# Patient Record
Sex: FEMALE | Race: WHITE | Employment: FULL TIME
[De-identification: names, ages, dates, MRNs, and addresses within clinical notes are randomized per-mention and may not be internally consistent; named-entity substitution may affect disease eponyms.]

---

## 2017-04-07 PROBLEM — Z13.29 SCREENING FOR ENDOCRINE DISORDER: Status: ACTIVE | Noted: 2017-04-07

## 2017-04-07 PROBLEM — R93.5 ABNORMAL CT OF THE ABDOMEN: Status: ACTIVE | Noted: 2017-04-07

## 2017-07-07 PROBLEM — R94.31 ECG ABNORMAL: Status: ACTIVE | Noted: 2017-07-07

## 2018-01-31 PROBLEM — J30.9 ALLERGIC RHINITIS, UNSPECIFIED SEASONALITY, UNSPECIFIED TRIGGER: Status: ACTIVE | Noted: 2018-01-31

## 2018-06-14 PROBLEM — K64.9 HEMORRHOIDS, UNSPECIFIED HEMORRHOID TYPE: Status: ACTIVE | Noted: 2018-06-14

## 2018-06-14 PROBLEM — Z00.00 PREVENTATIVE HEALTH CARE: Status: ACTIVE | Noted: 2018-06-14

## 2018-06-14 PROBLEM — J45.909 REACTIVE AIRWAY DISEASE WITHOUT COMPLICATION, UNSPECIFIED ASTHMA SEVERITY, UNSPECIFIED WHETHER PERSISTENT: Status: ACTIVE | Noted: 2018-06-14

## 2018-08-06 PROBLEM — M54.9 BACK PAIN, UNSPECIFIED BACK LOCATION, UNSPECIFIED BACK PAIN LATERALITY, UNSPECIFIED CHRONICITY: Status: ACTIVE | Noted: 2018-08-06

## 2018-08-06 PROCEDURE — 81025 URINE PREGNANCY TEST: CPT | Performed by: INTERNAL MEDICINE

## 2018-12-07 PROBLEM — Z13.29 SCREENING FOR ENDOCRINE DISORDER: Status: RESOLVED | Noted: 2017-04-07 | Resolved: 2018-12-07

## 2018-12-07 PROBLEM — J30.9 ALLERGIC RHINITIS, UNSPECIFIED SEASONALITY, UNSPECIFIED TRIGGER: Status: RESOLVED | Noted: 2018-01-31 | Resolved: 2018-12-07

## 2018-12-07 PROBLEM — F32.A DEPRESSION, UNSPECIFIED DEPRESSION TYPE: Status: ACTIVE | Noted: 2018-12-07

## 2018-12-07 PROBLEM — F41.9 ANXIETY: Status: ACTIVE | Noted: 2018-12-07

## 2018-12-27 PROCEDURE — 87086 URINE CULTURE/COLONY COUNT: CPT | Performed by: OBSTETRICS & GYNECOLOGY

## 2019-01-17 PROCEDURE — 87086 URINE CULTURE/COLONY COUNT: CPT | Performed by: INTERNAL MEDICINE

## 2019-01-17 PROCEDURE — 81001 URINALYSIS AUTO W/SCOPE: CPT | Performed by: INTERNAL MEDICINE

## 2019-01-17 PROCEDURE — 87147 CULTURE TYPE IMMUNOLOGIC: CPT | Performed by: INTERNAL MEDICINE

## 2019-01-17 PROCEDURE — 87040 BLOOD CULTURE FOR BACTERIA: CPT | Performed by: INTERNAL MEDICINE

## 2019-01-19 PROBLEM — R94.31 ECG ABNORMAL: Status: RESOLVED | Noted: 2017-07-07 | Resolved: 2019-01-19

## 2019-01-19 PROBLEM — R50.9 FEVER, UNSPECIFIED FEVER CAUSE: Status: ACTIVE | Noted: 2019-01-19

## 2019-01-19 PROBLEM — R73.9 HYPERGLYCEMIA: Status: ACTIVE | Noted: 2019-01-19

## 2019-01-19 PROBLEM — Z90.710 STATUS POST VAGINAL HYSTERECTOMY: Status: ACTIVE | Noted: 2019-01-19

## 2019-02-04 PROBLEM — J30.9 ALLERGIC RHINITIS, UNSPECIFIED SEASONALITY, UNSPECIFIED TRIGGER: Status: RESOLVED | Noted: 2018-01-31 | Resolved: 2019-02-04

## 2019-02-04 PROBLEM — R73.9 HYPERGLYCEMIA: Status: RESOLVED | Noted: 2019-01-19 | Resolved: 2019-02-04

## 2019-02-04 PROBLEM — R50.9 FEVER, UNKNOWN ORIGIN: Status: ACTIVE | Noted: 2019-01-19

## 2019-02-04 PROBLEM — R93.5 ABNORMAL CT OF THE ABDOMEN: Status: RESOLVED | Noted: 2017-04-07 | Resolved: 2019-02-04

## 2019-02-04 PROCEDURE — 87086 URINE CULTURE/COLONY COUNT: CPT | Performed by: OBSTETRICS & GYNECOLOGY

## 2019-02-04 PROCEDURE — 81001 URINALYSIS AUTO W/SCOPE: CPT | Performed by: OBSTETRICS & GYNECOLOGY

## 2020-03-09 PROBLEM — R06.02 SOB (SHORTNESS OF BREATH): Status: ACTIVE | Noted: 2020-03-09

## 2020-03-16 PROBLEM — J30.2 PERENNIAL ALLERGIC RHINITIS WITH SEASONAL VARIATION: Status: ACTIVE | Noted: 2020-03-16

## 2020-03-16 PROBLEM — B34.9 VIRAL INFECTION: Status: ACTIVE | Noted: 2020-03-16

## 2020-03-16 PROBLEM — J30.89 PERENNIAL ALLERGIC RHINITIS WITH SEASONAL VARIATION: Status: ACTIVE | Noted: 2020-03-16

## 2020-07-29 PROBLEM — J45.909 REACTIVE AIRWAY DISEASE WITHOUT COMPLICATION, UNSPECIFIED ASTHMA SEVERITY, UNSPECIFIED WHETHER PERSISTENT: Status: RESOLVED | Noted: 2018-06-14 | Resolved: 2020-07-29

## 2020-07-29 PROBLEM — J30.9 ALLERGIC RHINITIS, UNSPECIFIED SEASONALITY, UNSPECIFIED TRIGGER: Status: RESOLVED | Noted: 2018-01-31 | Resolved: 2020-07-29

## 2020-07-29 PROBLEM — R06.02 SOB (SHORTNESS OF BREATH): Status: RESOLVED | Noted: 2020-03-09 | Resolved: 2020-07-29

## 2020-07-29 PROBLEM — R50.9 FEVER, UNKNOWN ORIGIN: Status: RESOLVED | Noted: 2019-01-19 | Resolved: 2020-07-29

## 2020-07-29 PROBLEM — B34.9 VIRAL INFECTION: Status: RESOLVED | Noted: 2020-03-16 | Resolved: 2020-07-29
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
## 2020-08-19 PROBLEM — S06.0X0D CONCUSSION WITHOUT LOSS OF CONSCIOUSNESS, SUBSEQUENT ENCOUNTER: Status: ACTIVE | Noted: 2020-08-19

## 2020-10-08 ENCOUNTER — NURSE TRIAGE (OUTPATIENT)
Dept: OTHER | Facility: CLINIC | Age: 40
End: 2020-10-08

## 2020-10-08 NOTE — TELEPHONE ENCOUNTER
Patient called in via the Employee Initial Symptoms Review Line to report symptoms of abdominal pain, diarrhea, vomiting and fatigue. Pt states symptoms started today. Reports possible exposure to positive patients at work. Hx of IBS. Denies fever or breathing difficulty at this time. Informed of disposition. Provided with occupational health number to follow up with as well. Care advice as documented. Instructed to call back with worsening symptoms. Verbalized understanding and denies any further questions/concerns. Please do not respond to the triage nurse through this encounter. Any subsequent communication should be directly with the patient. Reason for Disposition   HIGH RISK patient (e.g., age > 59 years, diabetes, heart or lung disease, weak immune system)    Answer Assessment - Initial Assessment Questions  1. COVID-19 DIAGNOSIS: \"Who made your Coronavirus (COVID-19) diagnosis? \" \"Was it confirmed by a positive lab test?\" If not diagnosed by a HCP, ask \"Are there lots of cases (community spread) where you live? \" (See public health department website, if unsure)      Possible exposure at work  2. ONSET: \"When did the COVID-19 symptoms start? \"       Today 10/8/20  3. WORST SYMPTOM: \"What is your worst symptom? \" (e.g., cough, fever, shortness of breath, muscle aches)      Abdominal pain  4. COUGH: \"Do you have a cough? \" If so, ask: \"How bad is the cough? \"        no  5. FEVER: \"Do you have a fever? \" If so, ask: \"What is your temperature, how was it measured, and when did it start? \"      No  6. RESPIRATORY STATUS: \"Describe your breathing? \" (e.g., shortness of breath, wheezing, unable to speak)       \"normal\"  7. BETTER-SAME-WORSE: Merla Soho you getting better, staying the same or getting worse compared to yesterday? \"  If getting worse, ask, \"In what way? \"      [place] started today  8. HIGH RISK DISEASE: \"Do you have any chronic medical problems? \" (e.g., asthma, heart or lung disease, weak immune system, etc.)      IBS  9. PREGNANCY: \"Is there any chance you are pregnant? \" \"When was your last menstrual period? \"      No  10. OTHER SYMPTOMS: \"Do you have any other symptoms? \"  (e.g., chills, fatigue, headache, loss of smell or taste, muscle pain, sore throat)        Abdominal pain, diarrhea, fatigue, vomiting.     Protocols used: CORONAVIRUS (COVID-19) DIAGNOSED OR SUSPECTED-ADULT-

## 2020-11-18 PROBLEM — J45.901 MODERATE ASTHMA WITH ACUTE EXACERBATION, UNSPECIFIED WHETHER PERSISTENT: Status: ACTIVE | Noted: 2020-11-18

## 2020-11-18 PROBLEM — J06.9 UPPER RESPIRATORY TRACT INFECTION, UNSPECIFIED TYPE: Status: ACTIVE | Noted: 2020-11-18

## 2020-12-01 ENCOUNTER — NURSE TRIAGE (OUTPATIENT)
Dept: OTHER | Facility: CLINIC | Age: 40
End: 2020-12-01

## 2020-12-01 NOTE — TELEPHONE ENCOUNTER
period? \" \"Did you deliver in the last 2 weeks? \"      Denies    10. HIGH RISK: \"Do you have any heart or lung problems? Do you have a weak immune system? \" (e.g., CHF, COPD, asthma, HIV positive, chemotherapy, renal failure, diabetes mellitus, sickle cell anemia)        Denies    Protocols used: CORONAVIRUS (COVID-19) EXPOSURE-ADULT-[place], CORONAVIRUS (COVID-19) DIAGNOSED OR SUSPECTED-ADULT-[place]    Patient called [facility]    See above questions and answers. Caller talking full sentences without any distress on phone. Discussed disposition and patient agreeable. Aware to call back with any concerns or persistent, worsening, or new symptoms develop. Attention provider: Your patient utilized nurse triage services offered by employer, payer or community. This encounter includes an overview of the reason for call, assessment and recommended disposition. Please do not respond through this encounter as the response is not directed to a shared pool. Sent pt to [id number] Nurse Symptom Review.

## 2020-12-30 ENCOUNTER — OFFICE VISIT (OUTPATIENT)
Dept: PRIMARY CARE CLINIC | Age: 40
End: 2020-12-30

## 2020-12-30 ENCOUNTER — HOSPITAL ENCOUNTER (OUTPATIENT)
Age: 40
Setting detail: SPECIMEN
Discharge: HOME | End: 2020-12-30
Payer: COMMERCIAL

## 2020-12-30 VITALS — TEMPERATURE: 97 F

## 2020-12-30 PROCEDURE — 99213 OFFICE O/P EST LOW 20 MIN: CPT | Performed by: NURSE PRACTITIONER

## 2020-12-30 PROCEDURE — U0002 COVID-19 LAB TEST NON-CDC: HCPCS

## 2020-12-30 NOTE — PROGRESS NOTES
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, Employee RN access line    [] Fevers  [] Chills  [x] Cough  [] Coughing up blood  [] Chest Congestion  [x] Nasal Congestion  [] Feeling short of breath  [] Sometimes  [] Frequently  [] All the time  [] Chest pain  [x] Headaches  [x]Tolerable  [] Severe  [x] Sore throat  [] Muscle aches  [] Nausea  [] Vomiting  []Unable to keep fluids down  [] Diarrhea  []Severe    [x] OTHER SYMPTOMS: DIZZINESS/ LIGHTHEADED       Symptom Duration:   [] 1  [] 2   [] 3   [] 4    [] 5   [] 6   [] 7   [x] 8   [] 9   [] 10   [] 11   [] 12   [] 13   [] 14   [] Longer than 14 days    Symptom course:   [x] Worsening     [] Stable     [] Improving    RISK FACTORS:    [] Pregnant or possibly pregnant  [] Age over 61  [] Diabetes  [] Heart disease  [] Asthma  [] COPD/Other chronic lung diseases  [] Active Cancer  [] On Chemotherapy  [] Taking oral steroids  [] History Lymphoma/Leukemia  [x] Close contact with a lab confirmed COVID-19 patient within 14 days of symptom onset  [] History of travel from affected geographical areas within 14 days of symptom onset       VITALS:  There were no vitals filed for this visit. TESTS:    POCT FLU:  [] Positive     []Negative    ASSESSMENT:    [] Flu  [] Possible COVID-19  [] Strep    PLAN:    [] Discharge home with written instructions for:  [] Flu management  [] Possible COVID-19 infection with self-quarantine and management of symptoms  [] Follow-up with primary care physician or emergency department if worsens  [] Evaluation per physician/NP/PA in clinic  [] Sent to ER       An  electronic signature was used to authenticate this note.      --Jorge Garnett MA on 12/30/2020 at 1:32 PM

## 2020-12-30 NOTE — PATIENT INSTRUCTIONS
Your COVID 19 test can take 3-5 days for the results come back. We ask that you make a Mychart page and view your test results this way. You will need to Self quarantine until you know your results. Increase fluids rest  Saline nasal spray as directed  Warm salt gargles for throat discomfort  Monitor temperature twice a day  Tylenol for fevers and/or discomfort. If symptoms are worse -Go to the ER. Follow up with your primary doctor    To Whom it May Concern:    Meli Cm has been tested for COVID on 12/30/20. They may NOT return to work until their lab test results back and they been fever free for 3 days. If test is positive they must stay home for 2 weeks or until they test negative or as directed by the [facility] Department.

## 2020-12-30 NOTE — PROGRESS NOTES
12/30/2020    HPI:  Chief complaint and history of present illness as per medical assistant/nurse documented today in the Flu/COVID-19 clinic. MEDICATIONS:  Prior to Visit Medications    Not on File       Not on File, No past medical history on file., No past surgical history on file. PHYSICAL EXAM:  Physical Exam  Temp:  97.0  Heart Rate:  78    Pulse Ox:  99    Constitutional:  Well developed, well nourished  HENT:  Normocephalic, atraumatic, bilateral external ears normal, bilateral TMs normal, oropharynx moist, nose normal  Eyes:  conjunctiva normal, no discharge, no scleral icterus  Cardiovascular:  Normal heart rate, normal rhythm, no murmurs, gallops or rubs  Thorax & Lungs:  Normal breath sounds, no respiratory distress, no wheezing  Skin:  Warm, dry, no erythema, no rash  Neurologic:  Alert & oriented   Psychiatric:  Affect normal, mood normal    ASSESSMENT/PLAN:  1. Close exposure to COVID-19 virus    - COVID-19 Ambulatory    2. Cough    - COVID-19 Ambulatory    3. Dizziness    - COVID-19 Ambulatory  Pt here for second test in in last 10 days, went to work today and was told to leave to get covid test due to being lightheaded and dizzy. Advised pt to increase fluids and activity at home. FOLLOW-UP:  No follow-ups on file.     In addition to other information, the printed after visit summary provided to the patient includes:  [x] COVID-19 Self care instructions  [x] [id number] General patient information    April T Hunter

## 2021-01-01 LAB
SARS-COV-2: NOT DETECTED
SOURCE: NORMAL

## 2021-01-06 ENCOUNTER — NURSE TRIAGE (OUTPATIENT)
Dept: OTHER | Facility: CLINIC | Age: 41
End: 2021-01-06

## 2021-01-06 NOTE — TELEPHONE ENCOUNTER
Reason for Disposition   SEVERE chest pain    Answer Assessment - Initial Assessment Questions  1. LOCATION: \"Where does it hurt? \"      riight had my sternum and deep inside. Feels like someone squeezing my lungs. 2. RADIATION: \"Does the pain go anywhere else? \" (e.g., into neck, jaw, arms, back)      Back    3. ONSET: \"When did the chest pain begin? \" (Minutes, hours or days)       This morning around 0200    4. PATTERN \"Does the pain come and go, or has it been constant since it started? \"  \"Does it get worse with exertion? \"       Constant. Worse with exertion or with deep breath. 5. DURATION: \"How long does it last\" (e.g., seconds, minutes, hours)      It's constant    6. SEVERITY: \"How bad is the pain? \"  (e.g., Scale 1-10; mild, moderate, or severe)     - MILD (1-3): doesn't interfere with normal activities      - MODERATE (4-7): interferes with normal activities or awakens from sleep     - SEVERE (8-10): excruciating pain, unable to do any normal activities        8/10    7. CARDIAC RISK FACTORS: \"Do you have any history of heart problems or risk factors for heart disease? \" (e.g., angina, prior heart attack; diabetes, high blood pressure, high cholesterol, smoker, or strong family history of heart disease)      No    8. PULMONARY RISK FACTORS: \"Do you have any history of lung disease? \"  (e.g., blood clots in lung, asthma, emphysema, birth control pills)      No    9. CAUSE: \"What do you think is causing the chest pain? \"      I'm not sure    10. OTHER SYMPTOMS: \"Do you have any other symptoms? \" (e.g., dizziness, nausea, vomiting, sweating, fever, difficulty breathing, cough)        Dizziness, vertigo,cough, it hurts to take deep breaths    11. PREGNANCY: \"Is there any chance you are pregnant? \" \"When was your last menstrual period? \"        No- just had D & C 12-    Protocols used: CHEST PAIN-ADULT-[place]  Call became disconnected. Tried to call pt back and phone disconnected.   Caller had initially come through on EIS line and pt triaged due to symptoms of blood loss after D & C. Attempted to call NP for 2nd level triage, unable to get in contact with either one. . Left message with Frederic Gutierrez NP.     80- Was able to find a working number for patient. Pt has appt with OB at 1600 today.

## 2021-02-24 ENCOUNTER — PROCEDURE VISIT (OUTPATIENT)
Dept: CARDIOLOGY CLINIC | Age: 41
End: 2021-02-24
Payer: COMMERCIAL

## 2021-02-24 ENCOUNTER — NURSE TRIAGE (OUTPATIENT)
Dept: OTHER | Facility: CLINIC | Age: 41
End: 2021-02-24

## 2021-02-24 ENCOUNTER — OFFICE VISIT (OUTPATIENT)
Dept: CARDIOLOGY CLINIC | Age: 41
End: 2021-02-24
Payer: COMMERCIAL

## 2021-02-24 VITALS
SYSTOLIC BLOOD PRESSURE: 138 MMHG | BODY MASS INDEX: 24.92 KG/M2 | DIASTOLIC BLOOD PRESSURE: 88 MMHG | HEART RATE: 100 BPM | HEIGHT: 61 IN | WEIGHT: 132 LBS

## 2021-02-24 DIAGNOSIS — R07.9 CHEST PAIN, UNSPECIFIED TYPE: Primary | ICD-10-CM

## 2021-02-24 DIAGNOSIS — R06.02 SOB (SHORTNESS OF BREATH): ICD-10-CM

## 2021-02-24 DIAGNOSIS — R00.2 PALPITATIONS: ICD-10-CM

## 2021-02-24 DIAGNOSIS — R07.2 PRECORDIAL PAIN: ICD-10-CM

## 2021-02-24 DIAGNOSIS — R07.9 CHEST PAIN, UNSPECIFIED TYPE: ICD-10-CM

## 2021-02-24 LAB
LV EF: 58 %
LVEF MODALITY: NORMAL

## 2021-02-24 PROCEDURE — G8427 DOCREV CUR MEDS BY ELIG CLIN: HCPCS | Performed by: INTERNAL MEDICINE

## 2021-02-24 PROCEDURE — 93306 TTE W/DOPPLER COMPLETE: CPT | Performed by: INTERNAL MEDICINE

## 2021-02-24 PROCEDURE — 99203 OFFICE O/P NEW LOW 30 MIN: CPT | Performed by: INTERNAL MEDICINE

## 2021-02-24 PROCEDURE — 1036F TOBACCO NON-USER: CPT | Performed by: INTERNAL MEDICINE

## 2021-02-24 PROCEDURE — G8484 FLU IMMUNIZE NO ADMIN: HCPCS | Performed by: INTERNAL MEDICINE

## 2021-02-24 PROCEDURE — G8420 CALC BMI NORM PARAMETERS: HCPCS | Performed by: INTERNAL MEDICINE

## 2021-02-24 PROCEDURE — 93000 ELECTROCARDIOGRAM COMPLETE: CPT | Performed by: INTERNAL MEDICINE

## 2021-02-24 NOTE — TELEPHONE ENCOUNTER
Employee calling in because she is having chest pain and increased heart rate. 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 They did a EKG, and ECHO and the doctor looked at them. Employee states that the cardiologist is going to put her on medication and is going home for the day. Just wanted to call in and advise us of what happened and the care that she has received already. Reason for Disposition   Information only question and nurse able to answer    Answer Assessment - Initial Assessment Questions  1. REASON FOR CALL or QUESTION: \"What is your reason for calling today? \" or \"How can I best help you? \" or \"What question do you have that I can help answer? \"      See above. Protocols used: INFORMATION ONLY CALL - NO TRIAGE-ADULT-[place]    B caller    Brief description of triage: No triage just info only as to employee has already gotten care. Care advice provided, patient verbalizes understanding; denies any other questions or concerns; instructed to call back for any new or worsening symptoms. Attention provider: Your patient utilized nurse triage services offered by employer, payer or community. This encounter includes an overview of the reason for call, assessment and recommended disposition. Please do not respond through this encounter as the response is not directed to a shared pool.

## 2021-02-24 NOTE — PROGRESS NOTES
CARDIAC CONSULT NOTE       Yancy Galvan  42 y.o.  female    Chief Complaint   Patient presents with    Chest Pain     Patient here today complaining of left side chest pain that started earlier this morning, lasting 5 to 6 seconds that radiates to left shoulder area; tender to the touch and deep breathing makes it worse. Patient denies edema.  Palpitations     Patient complains of  constant palpitations that started this morning with the chest pain    Shortness of Breath     Patient complains of shortness of breath that started this morning with the chest pain and palpitations.  Dizziness     Patient complains of dizziness that is constant since chest pain, palpitations, and shortness of breath that all started this morning. Referring physician:  No primary care provider on file. Primary care physician:  No primary care provider on file. History of Present Illness:     Yancy Galvan is a 42 y.o. female referred for evaluation and management of chest pain. CHEST PAIN:  1. When did it began: Earlier this am. 5-6 second episodes  2. How long does it last:as above  3. How severe:8.5/10  4. Radiation:Left shoulder  5. Aggravating factors:deep breaths  6. Relieving factors:no  7. Associated features:Palpitations & SOB  8. Tenderness to palpation: Yes     has a past medical history of PTSD (post-traumatic stress disorder) and Seizures ([address] has no past surgical history on file. reports that she has never smoked. She has never used smokeless tobacco.    Family history is unknown by patient. Review of Systems:   1. Cardiovascular: No chest pain, dyspnea on exertion, palpitations or loss of consciousness  2. Respiratory: No cough or wheezing    3. Musculoskeletal:  No gait disturbance, weakness, muscle cramps, aches & pains or joint complaints  4. Neurological: No TIA or stroke symptoms  5. Psychiatric: No anxiety or depression  6.  Hematologic/Lymphatic: No medication:No .  3.Beta-Blocker therapy for CAD, if prior Myocardial Infarction:No   4. Counselled regarding smoking cessation. No   Patient does not Smoke. 5.Anticoagulation therapy (for A.Fib) No   Does Not have A.Fib.  6.Discussed weight management strategies. Assessment, Recommendations & Tests:     CHEST PAIN: Most likely Pleuro-Pericarditic. Echo is normal no effusion. Suggest Motrin 400 mg TID with food X 5 days. If no improvement will add Colchicine. Office Visit in 3 months.      Eb Matthews MD, 2/24/2021 10:35 AM

## 2021-02-24 NOTE — PATIENT INSTRUCTIONS
CHEST PAIN: Most likely Pleuro-Pericarditic. Echo is normal no effusion. Suggest Motrin 400 mg TID with food X 5 days. If no improvement will add Colchicine. Office Visit in 3 months.

## 2021-03-03 ENCOUNTER — TELEPHONE (OUTPATIENT)
Dept: CARDIOLOGY CLINIC | Age: 41
End: 2021-03-03

## 2021-03-03 NOTE — TELEPHONE ENCOUNTER
Results of Echo given to patient. Summary   Left ventricular function is normal, EF is estimated at 55-60%. No evidence of diastolic dysfunction. No regional wall motion abnormalities were detected. No significant valvular disease noted. RVSP is 9 mmHg.    No evidence of pericardial effusion

## 2021-04-23 ENCOUNTER — HOSPITAL ENCOUNTER (EMERGENCY)
Age: 41
Discharge: HOME | End: 2021-04-23
Payer: COMMERCIAL

## 2021-04-23 VITALS
BODY MASS INDEX: 24.11 KG/M2 | TEMPERATURE: 98.8 F | WEIGHT: 131 LBS | HEART RATE: 82 BPM | RESPIRATION RATE: 17 BRPM | DIASTOLIC BLOOD PRESSURE: 75 MMHG | OXYGEN SATURATION: 100 % | SYSTOLIC BLOOD PRESSURE: 118 MMHG | HEIGHT: 62 IN

## 2021-04-23 DIAGNOSIS — R21 RASH: ICD-10-CM

## 2021-04-23 DIAGNOSIS — T78.40XA ALLERGIC REACTION, INITIAL ENCOUNTER: Primary | ICD-10-CM

## 2021-04-23 PROCEDURE — 99284 EMERGENCY DEPT VISIT MOD MDM: CPT

## 2021-04-23 PROCEDURE — 6370000000 HC RX 637 (ALT 250 FOR IP): Performed by: PHYSICIAN ASSISTANT

## 2021-04-23 RX ORDER — DIPHENHYDRAMINE HCL 25 MG
25 TABLET ORAL EVERY 6 HOURS PRN
Status: DISCONTINUED | OUTPATIENT
Start: 2021-04-23 | End: 2021-04-23 | Stop reason: HOSPADM

## 2021-04-23 RX ORDER — DIPHENHYDRAMINE HCL 25 MG
25 CAPSULE ORAL EVERY 6 HOURS PRN
Qty: 20 CAPSULE | Refills: 0 | Status: SHIPPED | OUTPATIENT
Start: 2021-04-23 | End: 2021-05-03

## 2021-04-23 RX ADMIN — DIPHENHYDRAMINE HCL 25 MG: 25 TABLET ORAL at 11:22

## 2021-04-23 NOTE — ED NOTES
Discharge instructions reviewed with patient, verbalized understanding and agreeable to discharge.      Cade Holder RN  04/23/21 [id number]

## 2021-04-23 NOTE — ED PROVIDER NOTES
EMERGENCY DEPARTMENT ENCOUNTER      PCP: No primary care provider on file. CHIEF COMPLAINT    Chief Complaint   Patient presents with    Allergic Reaction     itching to forehead from an oil a coworker used to pray for patient         This patient was not evaluated by the attending physician. I have independently evaluated this patient . HPI    Spencer Andres is a 42 y.o. female who presents with a rash since the onset only 2 hours. Context is patient is allergic to olive oil and coworker touched her forehead after handling all of oil. She notes development of red itchy rash on forehead only since this time. Denies any constitutional symptoms, airway symptoms. She has not tried any medications for relief. REVIEW OF SYSTEMS    General: Denies fevers. ENT: Denies throat swelling or tongue swelling  Pulmonary: Denies wheezes, difficulty breathing,  or chest tightness  Skin: See HPI    All other review of systems are negative  See HPI and nursing notes for additional information     PAST MEDICAL & SURGICAL HISTORY    Past Medical History:   Diagnosis Date    PTSD (post-traumatic stress disorder)     Life long    Seizures ([facility] [address]     Since age 1     No past surgical history on file.     CURRENT MEDICATIONS        ALLERGIES    Allergies   Allergen Reactions    Phenobarbital Shortness Of Breath and Swelling       SOCIAL & FAMILY HISTORY    Social History     Socioeconomic History    Marital status:      Spouse name: Not on file    Number of children: Not on file    Years of education: Not on file    Highest education level: Not on file   Occupational History    Not on file   Social Needs    Financial resource strain: Not on file    Food insecurity     Worry: Not on file     Inability: Not on file    Transportation needs     Medical: Not on file     Non-medical: Not on file   Tobacco Use    Smoking status: Never Smoker    Smokeless tobacco: Never Used   Substance and Sexual Activity    distress. Rash is limited to forehead only. Serial rechecks reveal patient's rash improved and subjective itching improvement. Rash barely noticeable. Patient states she would like to go home at this time as rash is improving. Patient observed for approximately 3 hours post exposure to known allergen and I feel she is safe for discharge home at this time. Plan for discharge with Benadryl Rx. Patient has H2 blocker medication she takes daily-I recommend she continue to do so and avoid any known environmental allergens. Return to emergency Department warning signs discussed in detail with patient today who understands and agrees including but not limited to worsening rash,fever, mouth/tongue/lip swelling, trouble breathing or swallowing, or any new symptoms not present today. Clinical  IMPRESSION    1. Allergic reaction, initial encounter    2. Rash                Comment: Please note this report has been produced using speech recognition software and may contain errors related to that system including errors in grammar, punctuation, and spelling, as well as words and phrases that may be inappropriate. If there are any questions or concerns please feel free to contact the dictating provider for clarification.        Josie Kennedyma  04/23/21 0702

## 2021-05-19 PROBLEM — R05.9 COUGH: Status: ACTIVE | Noted: 2021-05-19

## 2021-08-31 PROBLEM — R05.9 COUGH: Status: RESOLVED | Noted: 2021-05-19 | Resolved: 2021-08-31

## 2021-08-31 PROBLEM — J06.9 UPPER RESPIRATORY TRACT INFECTION, UNSPECIFIED TYPE: Status: RESOLVED | Noted: 2020-11-18 | Resolved: 2021-08-31

## 2021-09-05 PROBLEM — Z01.818 PREOPERATIVE EXAMINATION: Status: ACTIVE | Noted: 2021-09-05

## 2021-10-10 ENCOUNTER — APPOINTMENT (OUTPATIENT)
Dept: GENERAL RADIOLOGY | Age: 41
End: 2021-10-10
Payer: COMMERCIAL

## 2021-10-10 ENCOUNTER — HOSPITAL ENCOUNTER (EMERGENCY)
Age: 41
Discharge: HOME | End: 2021-10-10
Attending: EMERGENCY MEDICINE
Payer: COMMERCIAL

## 2021-10-10 VITALS
DIASTOLIC BLOOD PRESSURE: 87 MMHG | HEART RATE: 91 BPM | TEMPERATURE: 97.3 F | WEIGHT: 134 LBS | OXYGEN SATURATION: 98 % | RESPIRATION RATE: 14 BRPM | HEIGHT: 61 IN | BODY MASS INDEX: 25.3 KG/M2 | SYSTOLIC BLOOD PRESSURE: 124 MMHG

## 2021-10-10 DIAGNOSIS — S93.402A SPRAIN OF LEFT ANKLE, UNSPECIFIED LIGAMENT, INITIAL ENCOUNTER: Primary | ICD-10-CM

## 2021-10-10 PROCEDURE — 73610 X-RAY EXAM OF ANKLE: CPT

## 2021-10-10 PROCEDURE — 99284 EMERGENCY DEPT VISIT MOD MDM: CPT

## 2021-10-10 ASSESSMENT — PAIN SCALES - GENERAL: PAINLEVEL_OUTOF10: 7

## 2021-10-10 ASSESSMENT — PAIN DESCRIPTION - ORIENTATION: ORIENTATION: LEFT

## 2021-10-10 ASSESSMENT — PAIN DESCRIPTION - DESCRIPTORS: DESCRIPTORS: ACHING

## 2021-10-10 NOTE — ED PROVIDER NOTES
Expenses:    Food Insecurity:     Worried About Running Out of Food in the Last Year:     [address] in the Last Year:    Transportation Needs:     Lack of Transportation (Medical):  Lack of Transportation (Non-Medical):    Physical Activity:     Days of Exercise per Week:     Minutes of Exercise per Session:    Stress:     Feeling of Stress :    Social Connections:     Frequency of Communication with Friends and Family:     Frequency of Social Gatherings with Friends and Family:     Attends Sikh Services:     Active Member of Clubs or Organizations:     Attends Club or Organization Meetings:     Marital Status:    Intimate Partner Violence:     Fear of Current or Ex-Partner:     Emotionally Abused:     Physically Abused:     Sexually Abused:      Family History   Family history unknown: Yes         PHYSICAL EXAM    VITAL SIGNS: /87   Pulse 91   Temp 97.3 °F (36.3 °C) (Skin)   Resp 14   Ht 5' 1\" (1.549 m)   Wt 134 lb (60.8 kg)   SpO2 98%   BMI 25.32 kg/m²   Constitutional:  Well developed, appears comfortable  HENT:  Atraumatic  Respiratory:  Nonlabored breathing  Musculoskeletal: The Left  ankle demonstrates generalized soft tissue swelling, greatest over lateral aspect. There is tenderness to palpation in this region. Range of motion difficult to assess due to pain - no obvious deficits. The ankle joint is stable. Achilles tendon clinically intact. No swelling, discoloration, or tenderness to palpation of the proximal to distal lower leg bones,  foot bones/toes  Right ankle in walking boot from previous injury    Vascular:  DP pulse 2+, capillary refill intact. Integument:  No open wounds. Neurologic:  Awake alert, no slurred speech     RADIOLOGY   Left ankle xrays:  No acute abnormalities. ED COURSE & MEDICAL DECISION MAKING       Vital signs and nursing notes reviewed during ED course. I have independently evaluated this patient .     All pertinent Lab data and radiographic results reviewed with patient at bedside. The patient and/or the family were informed of the results of any tests/labs/imaging, the treatment plan, and time was allotted to answer questions. Differential diagnosis considered include but are not limited to fracture, dislocation, contusion, sprain, vascular injury, neurologic injury. Patient was offered Aircast in the emergency department but she states she has one at home and requested an Ace wrap today. She was provided withcrutches in emergency department today. Recommend ice and elevation as much as possible. Recommended Tylenol or NSAIDs as needed for pain. Advance off of crutches to full weightbearing as tolerated. To followup with orthopedist if symptoms do not improve over the next 5-7 days. Plan of care explained to patient. Concerning signs and symptoms warranting a return visit to the Emergency Department were explained in detail. All questions and concerns were addressed to the patient's satisfaction. Patient understood and agreed with plan. The likelihood of other entities in the differential is insufficient to justify any further testing for them. This was explained to the patient. The patient was advised that persistent or worsening symptoms would requirefurther evaluation. Clinical  IMPRESSION    1. Sprain of left ankle, unspecified ligament, initial encounter              Comment: Please note this report has been produced using speech recognition software and may contain errors related to that system including errors in grammar, punctuation, and spelling, as well as words and phrases that may be inappropriate. If there are any questions or concerns please feel free to contact the dictating provider for clarification.         Bridgett Nails MD  10/10/21 7467

## 2021-10-10 NOTE — ED TRIAGE NOTES
Pt states she was walking into her house and stepped on an uneven place near a flower bed. She rolled her left ankle just prior to arrival, states she did put ice on it for about 5 minutes but that is all. Swelling noted to left lateral aspect of ankle.

## 2021-10-10 NOTE — ED NOTES
The left ankle was wrapped with an ace wrap and the patient was fitted with crutches.        Prashant Leung RN  10/10/21 0973

## 2021-10-23 ENCOUNTER — HOSPITAL ENCOUNTER (EMERGENCY)
Age: 41
Discharge: HOME | End: 2021-10-23
Attending: EMERGENCY MEDICINE
Payer: COMMERCIAL

## 2021-10-23 VITALS
WEIGHT: 138 LBS | OXYGEN SATURATION: 97 % | HEART RATE: 85 BPM | RESPIRATION RATE: 18 BRPM | TEMPERATURE: 98 F | HEIGHT: 64 IN | BODY MASS INDEX: 23.56 KG/M2 | DIASTOLIC BLOOD PRESSURE: 75 MMHG | SYSTOLIC BLOOD PRESSURE: 115 MMHG

## 2021-10-23 DIAGNOSIS — S61.212A LACERATION OF RIGHT MIDDLE FINGER WITHOUT FOREIGN BODY WITHOUT DAMAGE TO NAIL, INITIAL ENCOUNTER: Primary | ICD-10-CM

## 2021-10-23 PROCEDURE — 99282 EMERGENCY DEPT VISIT SF MDM: CPT | Performed by: EMERGENCY MEDICINE

## 2021-10-23 NOTE — ED INITIAL ASSESSMENT (HPI)
Pt had a glass dish break in her hand 2 days ago - she has a laceration to R 3 digit. Says it keeps bleeding, denies blood thinners.

## 2021-10-23 NOTE — ED PROVIDER NOTES
Patient Seen in: THE [facility] Emergency Department In [place]      History   Patient presents with:  Laceration    Stated Complaint: lac to rt 3rd digtit    Subjective:   HPI    50-year-old female presents for evaluation of continued bleeding from a lacerat Physical Exam  Vitals and nursing note reviewed. Constitutional:       General: She is not in acute distress. Appearance: She is not toxic-appearing. HENT:      Head: Normocephalic and atraumatic.       Mouth/Throat:      Mouth: Mucous membr discussed ongoing wound care. Follow-up with primary care. We discussed reasons return to immediate care.                      Disposition and Plan     Clinical Impression:  Laceration of right middle finger without foreign body without damage to nail, in

## 2021-12-05 PROBLEM — Z01.818 PREOPERATIVE EXAMINATION: Status: RESOLVED | Noted: 2021-09-05 | Resolved: 2021-12-05

## 2022-01-27 PROBLEM — R44.8 FACIAL PRESSURE: Status: ACTIVE | Noted: 2022-01-27

## 2022-01-27 PROBLEM — H93.291 ABNORMAL AUDITORY PERCEPTION OF RIGHT EAR: Status: ACTIVE | Noted: 2022-01-27
